# Patient Record
Sex: FEMALE | Race: ASIAN
[De-identification: names, ages, dates, MRNs, and addresses within clinical notes are randomized per-mention and may not be internally consistent; named-entity substitution may affect disease eponyms.]

---

## 2022-05-10 ENCOUNTER — ASOB RESULT (OUTPATIENT)
Age: 27
End: 2022-05-10

## 2022-05-10 ENCOUNTER — APPOINTMENT (OUTPATIENT)
Dept: ANTEPARTUM | Facility: CLINIC | Age: 27
End: 2022-05-10

## 2022-05-10 ENCOUNTER — APPOINTMENT (OUTPATIENT)
Dept: ANTEPARTUM | Facility: CLINIC | Age: 27
End: 2022-05-10
Payer: COMMERCIAL

## 2022-05-10 PROBLEM — Z00.00 ENCOUNTER FOR PREVENTIVE HEALTH EXAMINATION: Status: ACTIVE | Noted: 2022-05-10

## 2022-05-10 PROCEDURE — 76813 OB US NUCHAL MEAS 1 GEST: CPT

## 2022-05-10 PROCEDURE — 36416 COLLJ CAPILLARY BLOOD SPEC: CPT

## 2022-05-10 PROCEDURE — 76801 OB US < 14 WKS SINGLE FETUS: CPT

## 2022-06-24 ENCOUNTER — APPOINTMENT (OUTPATIENT)
Dept: ANTEPARTUM | Facility: CLINIC | Age: 27
End: 2022-06-24

## 2022-07-01 ENCOUNTER — APPOINTMENT (OUTPATIENT)
Dept: ANTEPARTUM | Facility: CLINIC | Age: 27
End: 2022-07-01

## 2022-07-01 ENCOUNTER — ASOB RESULT (OUTPATIENT)
Age: 27
End: 2022-07-01

## 2022-07-01 PROCEDURE — 76811 OB US DETAILED SNGL FETUS: CPT

## 2022-11-10 ENCOUNTER — TRANSCRIPTION ENCOUNTER (OUTPATIENT)
Age: 27
End: 2022-11-10

## 2022-11-10 ENCOUNTER — INPATIENT (INPATIENT)
Facility: HOSPITAL | Age: 27
LOS: 1 days | Discharge: HOME | End: 2022-11-12
Attending: STUDENT IN AN ORGANIZED HEALTH CARE EDUCATION/TRAINING PROGRAM | Admitting: STUDENT IN AN ORGANIZED HEALTH CARE EDUCATION/TRAINING PROGRAM

## 2022-11-10 VITALS — TEMPERATURE: 99 F

## 2022-11-10 DIAGNOSIS — Z3A.00 WEEKS OF GESTATION OF PREGNANCY NOT SPECIFIED: ICD-10-CM

## 2022-11-10 DIAGNOSIS — O26.899 OTHER SPECIFIED PREGNANCY RELATED CONDITIONS, UNSPECIFIED TRIMESTER: ICD-10-CM

## 2022-11-10 LAB
ANISOCYTOSIS BLD QL: SLIGHT — SIGNIFICANT CHANGE UP
BASOPHILS # BLD AUTO: 0.1 K/UL — SIGNIFICANT CHANGE UP (ref 0–0.2)
BASOPHILS NFR BLD AUTO: 0.9 % — SIGNIFICANT CHANGE UP (ref 0–2)
BLD GP AB SCN SERPL QL: NEGATIVE — SIGNIFICANT CHANGE UP
COVID-19 SPIKE DOMAIN AB INTERP: POSITIVE
COVID-19 SPIKE DOMAIN ANTIBODY RESULT: >250 U/ML — HIGH
ELLIPTOCYTES BLD QL SMEAR: SLIGHT — SIGNIFICANT CHANGE UP
EOSINOPHIL # BLD AUTO: 0 K/UL — SIGNIFICANT CHANGE UP (ref 0–0.5)
EOSINOPHIL NFR BLD AUTO: 0 % — SIGNIFICANT CHANGE UP (ref 0–6)
GIANT PLATELETS BLD QL SMEAR: PRESENT — SIGNIFICANT CHANGE UP
HCT VFR BLD CALC: 35.1 % — SIGNIFICANT CHANGE UP (ref 34.5–45)
HGB BLD-MCNC: 11.3 G/DL — LOW (ref 11.5–15.5)
IANC: 8.58 K/UL — HIGH (ref 1.8–7.4)
LYMPHOCYTES # BLD AUTO: 1.27 K/UL — SIGNIFICANT CHANGE UP (ref 1–3.3)
LYMPHOCYTES # BLD AUTO: 11.2 % — LOW (ref 13–44)
MANUAL SMEAR VERIFICATION: SIGNIFICANT CHANGE UP
MCHC RBC-ENTMCNC: 24.9 PG — LOW (ref 27–34)
MCHC RBC-ENTMCNC: 32.2 GM/DL — SIGNIFICANT CHANGE UP (ref 32–36)
MCV RBC AUTO: 77.3 FL — LOW (ref 80–100)
MONOCYTES # BLD AUTO: 0.3 K/UL — SIGNIFICANT CHANGE UP (ref 0–0.9)
MONOCYTES NFR BLD AUTO: 2.6 % — SIGNIFICANT CHANGE UP (ref 2–14)
NEUTROPHILS # BLD AUTO: 9.32 K/UL — HIGH (ref 1.8–7.4)
NEUTROPHILS NFR BLD AUTO: 81 % — HIGH (ref 43–77)
NEUTS BAND # BLD: 0.9 % — SIGNIFICANT CHANGE UP (ref 0–6)
OVALOCYTES BLD QL SMEAR: SLIGHT — SIGNIFICANT CHANGE UP
PLAT MORPH BLD: NORMAL — SIGNIFICANT CHANGE UP
PLATELET # BLD AUTO: 340 K/UL — SIGNIFICANT CHANGE UP (ref 150–400)
PLATELET COUNT - ESTIMATE: NORMAL — SIGNIFICANT CHANGE UP
POIKILOCYTOSIS BLD QL AUTO: SLIGHT — SIGNIFICANT CHANGE UP
POLYCHROMASIA BLD QL SMEAR: SLIGHT — SIGNIFICANT CHANGE UP
RBC # BLD: 4.54 M/UL — SIGNIFICANT CHANGE UP (ref 3.8–5.2)
RBC # FLD: 14.6 % — HIGH (ref 10.3–14.5)
RBC BLD AUTO: ABNORMAL
RH IG SCN BLD-IMP: POSITIVE — SIGNIFICANT CHANGE UP
RH IG SCN BLD-IMP: POSITIVE — SIGNIFICANT CHANGE UP
SARS-COV-2 IGG+IGM SERPL QL IA: >250 U/ML — HIGH
SARS-COV-2 IGG+IGM SERPL QL IA: POSITIVE
SARS-COV-2 RNA SPEC QL NAA+PROBE: SIGNIFICANT CHANGE UP
T PALLIDUM AB TITR SER: NEGATIVE — SIGNIFICANT CHANGE UP
VARIANT LYMPHS # BLD: 3.4 % — SIGNIFICANT CHANGE UP (ref 0–6)
WBC # BLD: 11.38 K/UL — HIGH (ref 3.8–10.5)
WBC # FLD AUTO: 11.38 K/UL — HIGH (ref 3.8–10.5)

## 2022-11-10 PROCEDURE — 76815 OB US LIMITED FETUS(S): CPT | Mod: 26

## 2022-11-10 PROCEDURE — 59025 FETAL NON-STRESS TEST: CPT | Mod: 26

## 2022-11-10 RX ORDER — BUTORPHANOL TARTRATE 2 MG/ML
2 INJECTION, SOLUTION INTRAMUSCULAR; INTRAVENOUS ONCE
Refills: 0 | Status: DISCONTINUED | OUTPATIENT
Start: 2022-11-10 | End: 2022-11-10

## 2022-11-10 RX ORDER — PRAMOXINE HYDROCHLORIDE 150 MG/15G
1 AEROSOL, FOAM RECTAL EVERY 4 HOURS
Refills: 0 | Status: DISCONTINUED | OUTPATIENT
Start: 2022-11-10 | End: 2022-11-12

## 2022-11-10 RX ORDER — KETOROLAC TROMETHAMINE 30 MG/ML
30 SYRINGE (ML) INJECTION ONCE
Refills: 0 | Status: DISCONTINUED | OUTPATIENT
Start: 2022-11-10 | End: 2022-11-10

## 2022-11-10 RX ORDER — AMPICILLIN TRIHYDRATE 250 MG
2 CAPSULE ORAL ONCE
Refills: 0 | Status: COMPLETED | OUTPATIENT
Start: 2022-11-10 | End: 2022-11-10

## 2022-11-10 RX ORDER — ACETAMINOPHEN 500 MG
975 TABLET ORAL
Refills: 0 | Status: DISCONTINUED | OUTPATIENT
Start: 2022-11-10 | End: 2022-11-12

## 2022-11-10 RX ORDER — OXYTOCIN 10 UNIT/ML
333.33 VIAL (ML) INJECTION
Qty: 20 | Refills: 0 | Status: DISCONTINUED | OUTPATIENT
Start: 2022-11-10 | End: 2022-11-10

## 2022-11-10 RX ORDER — OXYTOCIN 10 UNIT/ML
2 VIAL (ML) INJECTION
Qty: 30 | Refills: 0 | Status: DISCONTINUED | OUTPATIENT
Start: 2022-11-10 | End: 2022-11-10

## 2022-11-10 RX ORDER — HYDROCORTISONE 1 %
1 OINTMENT (GRAM) TOPICAL EVERY 6 HOURS
Refills: 0 | Status: DISCONTINUED | OUTPATIENT
Start: 2022-11-10 | End: 2022-11-12

## 2022-11-10 RX ORDER — AMPICILLIN TRIHYDRATE 250 MG
1 CAPSULE ORAL EVERY 4 HOURS
Refills: 0 | Status: DISCONTINUED | OUTPATIENT
Start: 2022-11-10 | End: 2022-11-10

## 2022-11-10 RX ORDER — AER TRAVELER 0.5 G/1
1 SOLUTION RECTAL; TOPICAL EVERY 4 HOURS
Refills: 0 | Status: DISCONTINUED | OUTPATIENT
Start: 2022-11-10 | End: 2022-11-12

## 2022-11-10 RX ORDER — OXYCODONE HYDROCHLORIDE 5 MG/1
5 TABLET ORAL
Refills: 0 | Status: DISCONTINUED | OUTPATIENT
Start: 2022-11-10 | End: 2022-11-12

## 2022-11-10 RX ORDER — MAGNESIUM HYDROXIDE 400 MG/1
30 TABLET, CHEWABLE ORAL
Refills: 0 | Status: DISCONTINUED | OUTPATIENT
Start: 2022-11-10 | End: 2022-11-12

## 2022-11-10 RX ORDER — INFLUENZA VIRUS VACCINE 15; 15; 15; 15 UG/.5ML; UG/.5ML; UG/.5ML; UG/.5ML
0.5 SUSPENSION INTRAMUSCULAR ONCE
Refills: 0 | Status: DISCONTINUED | OUTPATIENT
Start: 2022-11-10 | End: 2022-11-12

## 2022-11-10 RX ORDER — CHLORHEXIDINE GLUCONATE 213 G/1000ML
1 SOLUTION TOPICAL ONCE
Refills: 0 | Status: DISCONTINUED | OUTPATIENT
Start: 2022-11-10 | End: 2022-11-10

## 2022-11-10 RX ORDER — LANOLIN
1 OINTMENT (GRAM) TOPICAL EVERY 6 HOURS
Refills: 0 | Status: DISCONTINUED | OUTPATIENT
Start: 2022-11-10 | End: 2022-11-12

## 2022-11-10 RX ORDER — SIMETHICONE 80 MG/1
80 TABLET, CHEWABLE ORAL EVERY 4 HOURS
Refills: 0 | Status: DISCONTINUED | OUTPATIENT
Start: 2022-11-10 | End: 2022-11-12

## 2022-11-10 RX ORDER — DIPHENHYDRAMINE HCL 50 MG
25 CAPSULE ORAL EVERY 6 HOURS
Refills: 0 | Status: DISCONTINUED | OUTPATIENT
Start: 2022-11-10 | End: 2022-11-12

## 2022-11-10 RX ORDER — LANOLIN
1 OINTMENT (GRAM) TOPICAL
Qty: 0 | Refills: 0 | DISCHARGE
Start: 2022-11-10

## 2022-11-10 RX ORDER — OXYTOCIN 10 UNIT/ML
333.33 VIAL (ML) INJECTION
Qty: 20 | Refills: 0 | Status: DISCONTINUED | OUTPATIENT
Start: 2022-11-10 | End: 2022-11-11

## 2022-11-10 RX ORDER — OXYCODONE HYDROCHLORIDE 5 MG/1
5 TABLET ORAL ONCE
Refills: 0 | Status: DISCONTINUED | OUTPATIENT
Start: 2022-11-10 | End: 2022-11-12

## 2022-11-10 RX ORDER — SODIUM CHLORIDE 9 MG/ML
500 INJECTION, SOLUTION INTRAVENOUS ONCE
Refills: 0 | Status: DISCONTINUED | OUTPATIENT
Start: 2022-11-10 | End: 2022-11-10

## 2022-11-10 RX ORDER — SODIUM CHLORIDE 9 MG/ML
1000 INJECTION, SOLUTION INTRAVENOUS
Refills: 0 | Status: DISCONTINUED | OUTPATIENT
Start: 2022-11-10 | End: 2022-11-10

## 2022-11-10 RX ORDER — SODIUM CHLORIDE 9 MG/ML
3 INJECTION INTRAMUSCULAR; INTRAVENOUS; SUBCUTANEOUS EVERY 8 HOURS
Refills: 0 | Status: DISCONTINUED | OUTPATIENT
Start: 2022-11-10 | End: 2022-11-12

## 2022-11-10 RX ORDER — SENNA PLUS 8.6 MG/1
2 TABLET ORAL AT BEDTIME
Refills: 0 | Status: DISCONTINUED | OUTPATIENT
Start: 2022-11-10 | End: 2022-11-12

## 2022-11-10 RX ORDER — DIBUCAINE 1 %
1 OINTMENT (GRAM) RECTAL EVERY 6 HOURS
Refills: 0 | Status: DISCONTINUED | OUTPATIENT
Start: 2022-11-10 | End: 2022-11-12

## 2022-11-10 RX ORDER — IBUPROFEN 200 MG
600 TABLET ORAL EVERY 6 HOURS
Refills: 0 | Status: COMPLETED | OUTPATIENT
Start: 2022-11-10 | End: 2023-10-09

## 2022-11-10 RX ORDER — BENZOCAINE 10 %
1 GEL (GRAM) MUCOUS MEMBRANE EVERY 6 HOURS
Refills: 0 | Status: DISCONTINUED | OUTPATIENT
Start: 2022-11-10 | End: 2022-11-12

## 2022-11-10 RX ORDER — IBUPROFEN 200 MG
1 TABLET ORAL
Qty: 0 | Refills: 0 | DISCHARGE
Start: 2022-11-10

## 2022-11-10 RX ORDER — TETANUS TOXOID, REDUCED DIPHTHERIA TOXOID AND ACELLULAR PERTUSSIS VACCINE, ADSORBED 5; 2.5; 8; 8; 2.5 [IU]/.5ML; [IU]/.5ML; UG/.5ML; UG/.5ML; UG/.5ML
0.5 SUSPENSION INTRAMUSCULAR ONCE
Refills: 0 | Status: DISCONTINUED | OUTPATIENT
Start: 2022-11-10 | End: 2022-11-12

## 2022-11-10 RX ORDER — IBUPROFEN 200 MG
600 TABLET ORAL EVERY 6 HOURS
Refills: 0 | Status: DISCONTINUED | OUTPATIENT
Start: 2022-11-10 | End: 2022-11-12

## 2022-11-10 RX ADMIN — Medication 108 GRAM(S): at 15:00

## 2022-11-10 RX ADMIN — Medication 108 GRAM(S): at 10:37

## 2022-11-10 RX ADMIN — SENNA PLUS 2 TABLET(S): 8.6 TABLET ORAL at 22:37

## 2022-11-10 RX ADMIN — Medication 30 MILLIGRAM(S): at 18:34

## 2022-11-10 RX ADMIN — Medication 1000 MILLIUNIT(S)/MIN: at 17:30

## 2022-11-10 RX ADMIN — SODIUM CHLORIDE 3 MILLILITER(S): 9 INJECTION INTRAMUSCULAR; INTRAVENOUS; SUBCUTANEOUS at 22:30

## 2022-11-10 RX ADMIN — Medication 200 GRAM(S): at 06:48

## 2022-11-10 RX ADMIN — Medication 975 MILLIGRAM(S): at 22:32

## 2022-11-10 RX ADMIN — Medication 0.25 MILLIGRAM(S): at 13:48

## 2022-11-10 RX ADMIN — Medication 975 MILLIGRAM(S): at 23:00

## 2022-11-10 RX ADMIN — Medication 2 MILLIUNIT(S)/MIN: at 16:01

## 2022-11-10 NOTE — OB RN DELIVERY SUMMARY - NS_LABORCHARACTER_OBGYN_ALL_OB
Augmentation of labor/Internal electronic FM/External electronic FM/Antibiotics in labor/Meconium staining

## 2022-11-10 NOTE — OB PROVIDER LABOR PROGRESS NOTE - NS_SUBJECTIVE/OBJECTIVE_OBGYN_ALL_OB_FT
@ 40wks in labor labor  PT resting comfortably, sleeping with epidural insitu for expectant mgmt  GBS+
called to room because patient was reporting rectal pressure and having variable decelerations

## 2022-11-10 NOTE — DISCHARGE NOTE OB - CARE PLAN
1 Principal Discharge DX:	 (normal spontaneous vaginal delivery)  Assessment and plan of treatment:	post partum care

## 2022-11-10 NOTE — OB PROVIDER H&P - HISTORY OF PRESENT ILLNESS
25y/o  @40wks presents with painful contractions since early this morning that have increased in intensity, pain scale 10/10  Reports good fetal movement  Denies LOF/VB    Allergies: Denies  Medications: PNV, ASA, Ceflex    Medical HX: Pyelonephritis   Surgical HX: Denies  Denies Etoh/Smoke/Drugs/Psy

## 2022-11-10 NOTE — OB PROVIDER H&P - NSHPPHYSICALEXAM_GEN_ALL_CORE
Vital Signs Last 24 Hrs  T(C): 37.1 (10 Nov 2022 04:33), Max: 37.1 (10 Nov 2022 04:20)  T(F): 98.8 (10 Nov 2022 04:33), Max: 98.8 (10 Nov 2022 04:33)  HR: 65 (10 Nov 2022 04:33) (65 - 65)  BP: 112/60 (10 Nov 2022 04:33) (112/60 - 112/60)  RR: 18 (10 Nov 2022 04:33) (18 - 18)    Assessment reveals VSS  Abdomen soft, NT, gravid  Cat 1 tracing, ctx every 5 mins  Transabdominal Ultrasound- vtx  Vaginal Exam- 2/60/-3  A&Ox3  Lungs- clear bilateral  Heart- normal rate and regular rhythm  Extremities- Warm, Dry, no edema present, good pulses       PLAN:  Admit for Early Labor

## 2022-11-10 NOTE — OB RN DELIVERY SUMMARY - NS_SEPSISRSKCALC_OBGYN_ALL_OB_FT
EOS calculated successfully. EOS Risk Factor: 0.5/1000 live births ([facility] national incidence); GA=40w;Temp=100.04; ROM=3.733; GBS='Positive'; Antibiotics='Broad spectrum antibiotics > 4 hrs prior to birth'

## 2022-11-10 NOTE — DISCHARGE NOTE OB - PAIN PRESENT
Advised  try breast milk in the eye.. Jerry has not been exposed to a contagious bacterial infection since he  Does not go to . .   
Patient mother called requesting to speak with RN. She states that she has a question regarding Jerry's eye. She states that it is red and swollen.   
No

## 2022-11-10 NOTE — DISCHARGE NOTE OB - PATIENT PORTAL LINK FT
You can access the FollowMyHealth Patient Portal offered by [facility] by registering at the following website: [email or web address]. By joining [employer]’s FollowMyHealth portal, you will also be able to view your health information using other applications (apps) compatible with our system.

## 2022-11-10 NOTE — CHART NOTE - NSCHARTNOTEFT_GEN_A_CORE
Tracing reviewed with MD Myrick    Category 1 tracing     Last VE 8/80/-2    Plan is to startr augmentation with IV Pitocin    IV pitocin 2u x 2u increments q20min ordered    ARLENE Ty to be informed by ARLENE Valentine, jerica RN    \  Discussed with MD Ramez Keane

## 2022-11-10 NOTE — DISCHARGE NOTE OB - MATERIALS PROVIDED
Vaccinations/[facility]  Screening Program/  Immunization Record/Breastfeeding Mother’s Support Group Information/Guide to Postpartum Care/[facility] Hearing Screen Program/Back To Sleep Handout/Shaken Baby Prevention Handout/Breastfeeding Guide and Packet/Birth Certificate Instructions/Tdap Vaccination (VIS Pub Date: 2012) Vaccinations/[facility]  Screening Program/  Immunization Record/Breastfeeding Log/Breastfeeding Mother’s Support Group Information/Guide to Postpartum Care/[facility] Hearing Screen Program/Back To Sleep Handout/Shaken Baby Prevention Handout/Birth Certificate Instructions/Tdap Vaccination (VIS Pub Date: 2012)

## 2022-11-10 NOTE — OB PROVIDER LABOR PROGRESS NOTE - ASSESSMENT
VE: /-2, Buldging bag, posterior cervix  Patient w hx of BATOOL 25  Pain scale 0/10, PT feeling mild rectal pressure    Dr wilson notified  PT to be transferred to labor and delivery  Anticipate 
CNM OB Progress Note    Patient seen and evaluated at bedside.  Reports pressure. Uncomfortable w/ anesthesia epidural.      T(C): 37.4 (11-10-22 @ 11:22), Max: 37.4 (11-10-22 @ 11:22)  HR: 104 (11-10-22 @ 14:03) (56 - 104)  BP: 147/81 (11-10-22 @ 13:53) (95/52 - 147/81)  RR: 18 (11-10-22 @ 05:48) (18 - 18)  SpO2: 100% (11-10-22 @ 13:58) (90% - 100%)    SVE: 8/80/-2, asynclitic    EFM: 125bpnm, moderate variability, +accels, +variable decelerations  Cano Martin Pena:  irregular ctx    A/P 26y P0 @ 40wks admitted for early labor/pain control.     -Labor: Category 2 tracing  -Fetus: EFW 2608g  -GBS positive, IV Ampicillin q4hrs  -Analgesia: anesthesia epidural    patient examined and found to have abulging bag, after discussion with patient regarding AROM, patient agreed to AROM  AROM, moderate amount of meconium stained fluids  patient repositioned to left lateral   peanutball discussed and to be placed  approved for anesthesia epidural    Within minutes of AROM, prolonged deceleration noted, patient repositioned to left and right positions.   ISE applied, ISE cable reading with artifact, deceleration continued, ISE removed and replaced with new ISE.  ISE in place now, effectively working for ISE monitoring  Terbutaline 25mcg given now subq  LR Bolus initiated now  Oxygen via non-rebreather mask on now  repositioned to right tilt with improvement of tracing    MD Myrick at the bedside, present for interventions once second ISE placed  MD Soto, back up provider contacted by MD Ramez Soto on stand by, Service attending, MD Henderson covering patient now     -Continue to monitor with ISE & TOCO  -Recheck patient in 2 hours or PRN    Discussed with MD Ramez Keane

## 2022-11-10 NOTE — DISCHARGE NOTE OB - MEDICATION SUMMARY - MEDICATIONS TO TAKE
I will START or STAY ON the medications listed below when I get home from the hospital:    acetaminophen 325 mg oral tablet  -- 3 tab(s) by mouth every 6 hours, As Needed  -- Indication: For pain    ibuprofen 600 mg oral tablet  -- 1 tab(s) by mouth every 6 hours, As Needed  -- Indication: For pain    benzocaine 20% topical spray  -- 1 spray(s) on skin every 6 hours, As needed, for Perineal discomfort  -- Indication: For  (normal spontaneous vaginal delivery)    dibucaine 1% topical ointment  -- 1 application on skin every 6 hours, As needed, Perineal discomfort  -- Indication: For  (normal spontaneous vaginal delivery)    hydrocortisone 1% topical cream  -- 1 application on skin every 6 hours, As needed, Moderate Pain (4-6)  -- Indication: For  (normal spontaneous vaginal delivery)    pramoxine 1% topical cream  -- 1 application on skin every 4 hours, As needed, Moderate Pain (4-6)  -- Indication: For  (normal spontaneous vaginal delivery)    witch hazel 50% topical pad  -- 1 application on skin every 4 hours, As needed, Perineal discomfort  -- Indication: For  (normal spontaneous vaginal delivery)    lanolin topical ointment  -- 1 application on skin every 6 hours, As needed, nipple soreness  -- Indication: For Nipple soreness

## 2022-11-10 NOTE — OB NEONATOLOGY/PEDIATRICIAN DELIVERY SUMMARY - NSPEDSNEONOTESA_OBGYN_ALL_OB_FT
Peds called to delivery for category II FHT and light meconium. 40 wk male born via  to a 25 y/o  blood type O+ mother. Maternal history of current pyelo/UTI on keflex. No significant prenatal history. PNL Hep B NR, RPR NR, HIV neg, Rubella NI, GBS +, adequately treated with ampx3, last dose at 1500. AROM 1340 with light meconium fluids, approx. 4 hrs. Baby emerged vigorous, crying, was w/d/s/s with APGARS of 8/9. Scalp probe removed. Mom plans to breast feed, declines Hep B vaccine and consents circ. EOS 0.22. COVID [pending].  BW: 3240

## 2022-11-10 NOTE — OB RN DELIVERY SUMMARY - NSSELHIDDEN_OBGYN_ALL_OB_FT
[NS_DeliveryAttending1_OBGYN_ALL_OB_FT:[id number]],[NS_DeliveryAssist1_OBGYN_ALL_OB_FT:[id number]=],[NS_DeliveryRN_OBGYN_ALL_OB_FT:[id number]=],[NS_CirculateRN2_OBGYN_ALL_OB_FT:[id number]=]

## 2022-11-10 NOTE — OB PROVIDER H&P - PROBLEM SELECTOR PLAN 1
Admit for Early Labor  D/W Dr. Valdes  Routine Orders  Covid Swabbed  Stadol for pain management  GBS Positive- Ampicillin ordered  Expectant management, possible augmentation

## 2022-11-10 NOTE — OB PROVIDER DELIVERY SUMMARY - NSPROVIDERDELIVERYNOTE_OBGYN_ALL_OB_FT
Pt found to be fully dilated with urge to push,  of viable male infant. Head, shoulders and body delivered easily in OA position, RML episiotomy done to provide more room for descent. Placenta delivered intact. Vaginal exam revealed intact cervix, vaginal walls, sulci. RML episiotomy repaired in usual fashion with chromic suture. L labial tear repaired uncomplicated with vicryl suture. Bimanual exam performed, with minimal lower uterine atony note, 1000mcg of Cytotec given CA. Afterwards fundus and lower uterine segment firm. Excellent hemostasis.  Agapito Ramirez PGY1

## 2022-11-10 NOTE — OB RN PATIENT PROFILE - BREAST MILK PROVIDES PROTECTION AGAINST INFECTION
Statement Selected
Expected Date Of Service: 09/13/2022
Performing Laboratory: -[id number]
Billing Type: Third-Party Bill
Bill For Surgical Tray: no

## 2022-11-10 NOTE — DISCHARGE NOTE OB - CARE PROVIDER_API CALL
Jet Myrick)  Obstetrics and Gynecology  [address] [place] [address]  Phone: [phone]  Fax: [phone]  Follow Up Time: 2 weeks

## 2022-11-10 NOTE — OB RN PATIENT PROFILE - FALL HARM RISK - CONCLUSION
Lab Facility: 0 Expected Date Of Service: 09/08/2022 Billing Type: Third-Party Bill Performing Laboratory: -000 Bill For Surgical Tray: no Universal Safety Interventions

## 2022-11-10 NOTE — OB PROVIDER DELIVERY SUMMARY - NSSELHIDDEN_OBGYN_ALL_OB_FT
[NS_DeliveryAttending1_OBGYN_ALL_OB_FT:[email or web address]=],[NS_DeliveryAssist1_OBGYN_ALL_OB_FT:[id number]=]

## 2022-11-10 NOTE — OB RN TRIAGE NOTE - NS_SOURCEOFINFO_OBGYN_ALL_OB
Statement Selected Statement Selected Statement Selected Statement Selected Statement Selected Statement Selected Statement Selected Statement Selected Statement Selected Statement Selected Statement Selected Statement Selected Statement Selected Statement Selected Statement Selected Patient

## 2022-11-10 NOTE — DISCHARGE NOTE OB - NS MD DC FALL RISK RISK
For information on Fall & Injury Prevention, visit: [email or web address] OR  [email or web address] OR  https://www.cdc.gov/steadi/patient.html

## 2022-11-11 RX ORDER — PRAMOXINE HYDROCHLORIDE 150 MG/15G
1 AEROSOL, FOAM RECTAL
Qty: 0 | Refills: 0 | DISCHARGE
Start: 2022-11-11

## 2022-11-11 RX ORDER — DIBUCAINE 1 %
1 OINTMENT (GRAM) RECTAL
Qty: 0 | Refills: 0 | DISCHARGE
Start: 2022-11-11

## 2022-11-11 RX ORDER — AER TRAVELER 0.5 G/1
1 SOLUTION RECTAL; TOPICAL
Qty: 0 | Refills: 0 | DISCHARGE
Start: 2022-11-11

## 2022-11-11 RX ORDER — HYDROCORTISONE 1 %
1 OINTMENT (GRAM) TOPICAL
Qty: 0 | Refills: 0 | DISCHARGE
Start: 2022-11-11

## 2022-11-11 RX ORDER — CEPHALEXIN 500 MG
0 CAPSULE ORAL
Qty: 0 | Refills: 0 | DISCHARGE

## 2022-11-11 RX ORDER — ASPIRIN/CALCIUM CARB/MAGNESIUM 324 MG
0 TABLET ORAL
Qty: 0 | Refills: 0 | DISCHARGE

## 2022-11-11 RX ORDER — BENZOCAINE 10 %
1 GEL (GRAM) MUCOUS MEMBRANE
Qty: 0 | Refills: 0 | DISCHARGE
Start: 2022-11-11

## 2022-11-11 RX ORDER — ACETAMINOPHEN 500 MG
3 TABLET ORAL
Qty: 0 | Refills: 0 | DISCHARGE
Start: 2022-11-11

## 2022-11-11 RX ADMIN — SIMETHICONE 80 MILLIGRAM(S): 80 TABLET, CHEWABLE ORAL at 12:23

## 2022-11-11 RX ADMIN — Medication 975 MILLIGRAM(S): at 21:09

## 2022-11-11 RX ADMIN — SODIUM CHLORIDE 3 MILLILITER(S): 9 INJECTION INTRAMUSCULAR; INTRAVENOUS; SUBCUTANEOUS at 14:00

## 2022-11-11 RX ADMIN — Medication 600 MILLIGRAM(S): at 17:56

## 2022-11-11 RX ADMIN — Medication 975 MILLIGRAM(S): at 13:00

## 2022-11-11 RX ADMIN — Medication 975 MILLIGRAM(S): at 20:09

## 2022-11-11 RX ADMIN — Medication 600 MILLIGRAM(S): at 18:30

## 2022-11-11 RX ADMIN — Medication 975 MILLIGRAM(S): at 12:22

## 2022-11-11 RX ADMIN — Medication 1 TABLET(S): at 12:23

## 2022-11-11 NOTE — PROGRESS NOTE ADULT - ASSESSMENT
Assessment and Plan  PPD #1 s/p   Doing well and bonding with baby, good support at bedside  Encourage ambulation.  PP & PPD Instructions reviewed.  CPC.  D/C home today.  RTO 5-6 weeks for routine postpartum visit/PRN

## 2022-11-12 VITALS
TEMPERATURE: 98 F | DIASTOLIC BLOOD PRESSURE: 67 MMHG | OXYGEN SATURATION: 98 % | SYSTOLIC BLOOD PRESSURE: 130 MMHG | RESPIRATION RATE: 18 BRPM | HEART RATE: 94 BPM

## 2022-11-12 RX ADMIN — Medication 600 MILLIGRAM(S): at 01:13

## 2022-11-12 RX ADMIN — Medication 975 MILLIGRAM(S): at 11:20

## 2022-11-12 RX ADMIN — Medication 600 MILLIGRAM(S): at 11:56

## 2022-11-12 RX ADMIN — Medication 975 MILLIGRAM(S): at 10:24

## 2022-11-12 RX ADMIN — SODIUM CHLORIDE 3 MILLILITER(S): 9 INJECTION INTRAMUSCULAR; INTRAVENOUS; SUBCUTANEOUS at 06:52

## 2022-11-12 RX ADMIN — Medication 600 MILLIGRAM(S): at 12:50

## 2022-11-12 RX ADMIN — Medication 600 MILLIGRAM(S): at 07:40

## 2022-11-12 RX ADMIN — Medication 975 MILLIGRAM(S): at 04:02

## 2022-11-12 RX ADMIN — Medication 975 MILLIGRAM(S): at 05:02

## 2022-11-12 RX ADMIN — SENNA PLUS 2 TABLET(S): 8.6 TABLET ORAL at 00:11

## 2022-11-12 RX ADMIN — Medication 600 MILLIGRAM(S): at 00:13

## 2022-11-12 RX ADMIN — Medication 600 MILLIGRAM(S): at 06:40

## 2022-11-12 RX ADMIN — Medication 1 TABLET(S): at 11:56

## 2022-11-12 NOTE — PROGRESS NOTE ADULT - SUBJECTIVE AND OBJECTIVE BOX
Post-partum Note,   She is a  26y woman who is now post-partum day: 2    Subjective:  The patient feels well.  She is ambulating.   She is tolerating regular diet.  She denies nausea and vomiting; denies fever.  She is voiding.  Her pain is controlled.  She reports normal postpartum bleeding.  She is breastfeeding.  She is formula feeding.  She is bonding with infant.    Physical exam:    Vital Signs Last 24 Hrs  T(C): 36.7 (2022 05:59), Max: 37.1 (2022 14:37)  T(F): 98.1 (2022 05:59), Max: 98.7 (2022 14:37)  HR: 88 (2022 05:59) (64 - 88)  BP: 126/60 (2022 05:59) (105/72 - 126/60)  BP(mean): --  RR: 18 (2022 05:59) (18 - 18)  SpO2: 100% (2022 05:59) (98% - 100%)    Parameters below as of 2022 05:59  Patient On (Oxygen Delivery Method): room air        Gen: NAD  Breast: Soft, nontender, not engorged.  Abdomen: Soft, nontender, no distension , firm uterine fundus at umbilicus.  Pelvic: Normal lochia noted  Ext: No calf tenderness    LABS:      Rubella status:     Allergies    No Known Allergies    Intolerances      MEDICATIONS  (STANDING):  acetaminophen     Tablet .. 975 milliGRAM(s) Oral <User Schedule>  diphtheria/tetanus/pertussis (acellular) Vaccine (Adacel) 0.5 milliLiter(s) IntraMuscular once  ibuprofen  Tablet. 600 milliGRAM(s) Oral every 6 hours  influenza   Vaccine 0.5 milliLiter(s) IntraMuscular once  prenatal multivitamin 1 Tablet(s) Oral daily  senna 2 Tablet(s) Oral at bedtime  sodium chloride 0.9% lock flush 3 milliLiter(s) IV Push every 8 hours    MEDICATIONS  (PRN):  benzocaine 20%/menthol 0.5% Spray 1 Spray(s) Topical every 6 hours PRN for Perineal discomfort  dibucaine 1% Ointment 1 Application(s) Topical every 6 hours PRN Perineal discomfort  diphenhydrAMINE 25 milliGRAM(s) Oral every 6 hours PRN Pruritus  hydrocortisone 1% Cream 1 Application(s) Topical every 6 hours PRN Moderate Pain (4-6)  lanolin Ointment 1 Application(s) Topical every 6 hours PRN nipple soreness  magnesium hydroxide Suspension 30 milliLiter(s) Oral two times a day PRN Constipation  oxyCODONE    IR 5 milliGRAM(s) Oral every 3 hours PRN Moderate to Severe Pain (4-10)  oxyCODONE    IR 5 milliGRAM(s) Oral once PRN Moderate to Severe Pain (4-10)  pramoxine 1%/zinc 5% Cream 1 Application(s) Topical every 4 hours PRN Moderate Pain (4-6)  simethicone 80 milliGRAM(s) Chew every 4 hours PRN Gas  witch hazel Pads 1 Application(s) Topical every 4 hours PRN Perineal discomfort              
Post-partum Note,   She is a  26y woman who is now post-partum day: 1    Subjective:  The patient feels well.  She is ambulating.   She is tolerating regular diet.  She denies nausea and vomiting; denies fever.  She is voiding.  Her pain is controlled.  She reports normal postpartum bleeding.  She is breastfeeding.    Physical exam:    Vital Signs Last 24 Hrs  T(C): 36.7 (2022 09:44), Max: 37.8 (10 Nov 2022 15:11)  T(F): 98 (2022 09:44), Max: 100.04 (10 Nov 2022 15:11)  HR: 67 (:44) (50 - 143)  BP: 117/73 (:44) (100/57 - 147/81)  BP(mean): --  RR: 19 (2022 09:44) (14 - 19)  SpO2: 99% (:44) (84% - 100%)    Parameters below as of 2022 09:44  Patient On (Oxygen Delivery Method): room air        Gen: NAD  Breast: Soft, nontender, not engorged.  Abdomen: Soft, nontender, no distension , firm uterine fundus at umbilicus.  Pelvic: Normal lochia noted  Ext: No calf tenderness    LABS:                        11.3   11.38 )-----------( 340      ( 10 Nov 2022 05:45 )             35.1       Allergies    No Known Allergies      MEDICATIONS  (STANDING):  acetaminophen     Tablet .. 975 milliGRAM(s) Oral <User Schedule>  diphtheria/tetanus/pertussis (acellular) Vaccine (Adacel) 0.5 milliLiter(s) IntraMuscular once  ibuprofen  Tablet. 600 milliGRAM(s) Oral every 6 hours  influenza   Vaccine 0.5 milliLiter(s) IntraMuscular once  prenatal multivitamin 1 Tablet(s) Oral daily  senna 2 Tablet(s) Oral at bedtime  sodium chloride 0.9% lock flush 3 milliLiter(s) IV Push every 8 hours    MEDICATIONS  (PRN):  benzocaine 20%/menthol 0.5% Spray 1 Spray(s) Topical every 6 hours PRN for Perineal discomfort  dibucaine 1% Ointment 1 Application(s) Topical every 6 hours PRN Perineal discomfort  diphenhydrAMINE 25 milliGRAM(s) Oral every 6 hours PRN Pruritus  hydrocortisone 1% Cream 1 Application(s) Topical every 6 hours PRN Moderate Pain (4-6)  lanolin Ointment 1 Application(s) Topical every 6 hours PRN nipple soreness  magnesium hydroxide Suspension 30 milliLiter(s) Oral two times a day PRN Constipation  oxyCODONE    IR 5 milliGRAM(s) Oral every 3 hours PRN Moderate to Severe Pain (4-10)  oxyCODONE    IR 5 milliGRAM(s) Oral once PRN Moderate to Severe Pain (4-10)  pramoxine 1%/zinc 5% Cream 1 Application(s) Topical every 4 hours PRN Moderate Pain (4-6)  simethicone 80 milliGRAM(s) Chew every 4 hours PRN Gas  witch hazel Pads 1 Application(s) Topical every 4 hours PRN Perineal discomfort

## 2022-11-12 NOTE — PROVIDER CONTACT NOTE (OTHER) - ACTION/TREATMENT ORDERED:
MD stated they are currently at a delivery. Aware of patient staying overnight and will discontinue order when able.

## 2022-11-12 NOTE — PROVIDER CONTACT NOTE (OTHER) - SITUATION
Patient no longer wants to go home tonight due to weather conditions and concern for safety traveling with .

## 2022-11-12 NOTE — PROGRESS NOTE ADULT - ASSESSMENT
Assessment and Plan  PPD #2 s/p     Her pain is well controlled.   She is tolerating a regular diet and passing flatus.   Denies N/V. Denies CP/SOB/lightheadedness/dizziness.   She is ambulating without difficulty.   Voiding spontaneously.    Patient is stable and doing well postpartum  - Continue regular diet.  - Increase ambulation.  - Continue motrin, tylenol, oxycodone PRN for pain control.   -Encourage breastfeeding.    -PP educational material reviewed and provided.  -PP & PPD Instructions reviewed.    -Discharge planning>>>D/C home today    -Follow up @ [facility] in 6 weeks for postpartum visit  [phone]    Discussed with MD Danilo Keane

## 2024-02-23 NOTE — OB RN DELIVERY SUMMARY - NS_CORDBLDTYPEA_OBGYN_ALL_OB
Bill For Surgical Tray: no Expected Date Of Service: 01/12/2021 Billing Type: Third-Party Bill Yes Home

## 2024-07-31 NOTE — OB RN DELIVERY SUMMARY - NS_FORCEPSATTEMPT_OBGYN_ALL_OB
I recommend:  - Light activity such as daily walks will help with healing and recovery.  - Taking your pain medication as prescribed.  - Pelvic rest (nothing in vagina including sex and tampons, no baths or pools) for 6 weeks.    Please call the office if you experience:  - Fever or chills  - Difficulty breathing  - Excessive swelling in 1 leg  - Acute increase in pain  - Increased bleeding (enough to saturate a pad in 1 hour or less)   
Forceps were not used

## 2024-10-17 NOTE — OB RN TRIAGE NOTE - NS_ACCOMPAINEDBY_OBGYN_ALL_OB
Rec'd another call from patient.    Using  line* Patient requested to speak to Anu. She was not available at the time of the call. Patient also stated that she did not receive the email either. Email address [email or web address] was confirmed.    Please contact patient via phone at your earliest convenience. Thank you.   Spouse